# Patient Record
Sex: FEMALE | Race: WHITE
[De-identification: names, ages, dates, MRNs, and addresses within clinical notes are randomized per-mention and may not be internally consistent; named-entity substitution may affect disease eponyms.]

---

## 2019-10-31 ENCOUNTER — HOSPITAL ENCOUNTER (EMERGENCY)
Dept: HOSPITAL 76 - ED | Age: 34
LOS: 1 days | Discharge: LEFT BEFORE DISCHARGE | End: 2019-11-01
Payer: MEDICAID

## 2019-10-31 VITALS — SYSTOLIC BLOOD PRESSURE: 125 MMHG | DIASTOLIC BLOOD PRESSURE: 78 MMHG

## 2019-10-31 DIAGNOSIS — Z53.21: Primary | ICD-10-CM

## 2019-11-01 ENCOUNTER — HOSPITAL ENCOUNTER (INPATIENT)
Dept: HOSPITAL 76 - ED | Age: 34
LOS: 1 days | Discharge: LEFT BEFORE DISCHARGE | DRG: 872 | End: 2019-11-02
Attending: SPECIALIST | Admitting: INTERNAL MEDICINE
Payer: MEDICAID

## 2019-11-01 DIAGNOSIS — R45.1: ICD-10-CM

## 2019-11-01 DIAGNOSIS — L03.114: ICD-10-CM

## 2019-11-01 DIAGNOSIS — B19.20: ICD-10-CM

## 2019-11-01 DIAGNOSIS — F11.20: ICD-10-CM

## 2019-11-01 DIAGNOSIS — F15.20: ICD-10-CM

## 2019-11-01 DIAGNOSIS — Z59.0: ICD-10-CM

## 2019-11-01 DIAGNOSIS — L02.414: ICD-10-CM

## 2019-11-01 DIAGNOSIS — S51.032S: ICD-10-CM

## 2019-11-01 DIAGNOSIS — F17.210: ICD-10-CM

## 2019-11-01 DIAGNOSIS — A41.9: Primary | ICD-10-CM

## 2019-11-01 DIAGNOSIS — Z53.29: ICD-10-CM

## 2019-11-01 LAB
ALBUMIN DIAFP-MCNC: 3.6 G/DL (ref 3.2–5.5)
ALBUMIN/GLOB SERPL: 0.8 {RATIO} (ref 1–2.2)
ALP SERPL-CCNC: 73 IU/L (ref 42–121)
ALT SERPL W P-5'-P-CCNC: 65 IU/L (ref 10–60)
ANION GAP SERPL CALCULATED.4IONS-SCNC: 11 MMOL/L (ref 6–13)
AST SERPL W P-5'-P-CCNC: 65 IU/L (ref 10–42)
BASOPHILS NFR BLD AUTO: 0.1 10^3/UL (ref 0–0.1)
BASOPHILS NFR BLD AUTO: 0.5 %
BILIRUB BLD-MCNC: 0.7 MG/DL (ref 0.2–1)
BUN SERPL-MCNC: 18 MG/DL (ref 6–20)
CALCIUM UR-MCNC: 8.9 MG/DL (ref 8.5–10.3)
CHLORIDE SERPL-SCNC: 93 MMOL/L (ref 101–111)
CO2 SERPL-SCNC: 29 MMOL/L (ref 21–32)
CREAT SERPLBLD-SCNC: 0.5 MG/DL (ref 0.4–1)
EOSINOPHIL # BLD AUTO: 0.2 10^3/UL (ref 0–0.7)
EOSINOPHIL NFR BLD AUTO: 1.3 %
ERYTHROCYTE [DISTWIDTH] IN BLOOD BY AUTOMATED COUNT: 14 % (ref 12–15)
GFRSERPLBLD MDRD-ARVRAT: 142 ML/MIN/{1.73_M2} (ref 89–?)
GLOBULIN SER-MCNC: 4.5 G/DL (ref 2.1–4.2)
GLUCOSE SERPL-MCNC: 120 MG/DL (ref 70–100)
HGB UR QL STRIP: 11.6 G/DL (ref 12–16)
INR PPP: 1.2 (ref 0.8–1.2)
LIPASE SERPL-CCNC: 23 U/L (ref 22–51)
LYMPHOCYTES # SPEC AUTO: 2 10^3/UL (ref 1.5–3.5)
LYMPHOCYTES NFR BLD AUTO: 15.6 %
MCH RBC QN AUTO: 26.9 PG (ref 27–31)
MCHC RBC AUTO-ENTMCNC: 31.5 G/DL (ref 32–36)
MCV RBC AUTO: 85.2 FL (ref 81–99)
MONOCYTES # BLD AUTO: 0.8 10^3/UL (ref 0–1)
MONOCYTES NFR BLD AUTO: 5.9 %
NEUTROPHILS # BLD AUTO: 9.8 10^3/UL (ref 1.5–6.6)
NEUTROPHILS # SNV AUTO: 12.8 X10^3/UL (ref 4.8–10.8)
NEUTROPHILS NFR BLD AUTO: 76.2 %
PDW BLD AUTO: 9.2 FL (ref 7.9–10.8)
PLATELET # BLD: 423 10^3/UL (ref 130–450)
PROT SPEC-MCNC: 8.1 G/DL (ref 6.7–8.2)
PROTHROM ACT/NOR PPP: 13.3 SECS (ref 9.9–12.6)
RBC MAR: 4.32 10^6/UL (ref 4.2–5.4)
SODIUM SERPLBLD-SCNC: 133 MMOL/L (ref 135–145)

## 2019-11-01 PROCEDURE — 73201 CT UPPER EXTREMITY W/DYE: CPT

## 2019-11-01 PROCEDURE — 80048 BASIC METABOLIC PNL TOTAL CA: CPT

## 2019-11-01 PROCEDURE — 85025 COMPLETE CBC W/AUTO DIFF WBC: CPT

## 2019-11-01 PROCEDURE — 83735 ASSAY OF MAGNESIUM: CPT

## 2019-11-01 PROCEDURE — 87181 SC STD AGAR DILUTION PER AGT: CPT

## 2019-11-01 PROCEDURE — 99283 EMERGENCY DEPT VISIT LOW MDM: CPT

## 2019-11-01 PROCEDURE — 80053 COMPREHEN METABOLIC PANEL: CPT

## 2019-11-01 PROCEDURE — 99285 EMERGENCY DEPT VISIT HI MDM: CPT

## 2019-11-01 PROCEDURE — 83690 ASSAY OF LIPASE: CPT

## 2019-11-01 PROCEDURE — 36415 COLL VENOUS BLD VENIPUNCTURE: CPT

## 2019-11-01 PROCEDURE — 85651 RBC SED RATE NONAUTOMATED: CPT

## 2019-11-01 PROCEDURE — 87640 STAPH A DNA AMP PROBE: CPT

## 2019-11-01 PROCEDURE — 87040 BLOOD CULTURE FOR BACTERIA: CPT

## 2019-11-01 PROCEDURE — 82550 ASSAY OF CK (CPK): CPT

## 2019-11-01 PROCEDURE — 87070 CULTURE OTHR SPECIMN AEROBIC: CPT

## 2019-11-01 PROCEDURE — 85610 PROTHROMBIN TIME: CPT

## 2019-11-01 PROCEDURE — 96365 THER/PROPH/DIAG IV INF INIT: CPT

## 2019-11-01 PROCEDURE — 0H9EXZZ DRAINAGE OF LEFT LOWER ARM SKIN, EXTERNAL APPROACH: ICD-10-PCS | Performed by: ORTHOPAEDIC SURGERY

## 2019-11-01 PROCEDURE — 86140 C-REACTIVE PROTEIN: CPT

## 2019-11-01 PROCEDURE — 83605 ASSAY OF LACTIC ACID: CPT

## 2019-11-01 PROCEDURE — 87205 SMEAR GRAM STAIN: CPT

## 2019-11-01 PROCEDURE — 80202 ASSAY OF VANCOMYCIN: CPT

## 2019-11-01 RX ADMIN — SODIUM CHLORIDE, POTASSIUM CHLORIDE, SODIUM LACTATE AND CALCIUM CHLORIDE SCH MLS/HR: 600; 310; 30; 20 INJECTION, SOLUTION INTRAVENOUS at 16:41

## 2019-11-01 RX ADMIN — SODIUM CHLORIDE SCH MLS/HR: 9 INJECTION, SOLUTION INTRAVENOUS at 20:12

## 2019-11-01 RX ADMIN — OXYCODONE PRN MG: 5 TABLET ORAL at 20:48

## 2019-11-01 RX ADMIN — SODIUM CHLORIDE, POTASSIUM CHLORIDE, SODIUM LACTATE AND CALCIUM CHLORIDE SCH MLS/HR: 600; 310; 30; 20 INJECTION, SOLUTION INTRAVENOUS at 08:00

## 2019-11-01 RX ADMIN — SODIUM CHLORIDE SCH MLS/HR: 9 INJECTION, SOLUTION INTRAVENOUS at 13:57

## 2019-11-01 RX ADMIN — SODIUM CHLORIDE, PRESERVATIVE FREE SCH ML: 5 INJECTION INTRAVENOUS at 16:41

## 2019-11-01 RX ADMIN — HEPARIN SODIUM SCH UNIT: 5000 INJECTION, SOLUTION INTRAVENOUS; SUBCUTANEOUS at 20:36

## 2019-11-01 RX ADMIN — ACETAMINOPHEN PRN MG: 325 TABLET ORAL at 20:47

## 2019-11-01 RX ADMIN — THERA TABS SCH TAB: TAB at 16:41

## 2019-11-01 SDOH — ECONOMIC STABILITY - HOUSING INSECURITY: HOMELESSNESS: Z59.0

## 2019-11-01 NOTE — OPERATIVE REPORT
DATE OF SERVICE: 11/01/2019

Physician: Thien Saenz MD

 

PREOPERATIVE DIAGNOSIS:  Left forearm abscess.

 

POSTOPERATIVE DIAGNOSIS:  Left forearm abscess.

 

PROCEDURE PERFORMED:  Incision and drainage of left forearm abscess.

 

SURGEON:  Thien Saezn MD

 

ANESTHESIA:  General.

 

DESCRIPTION OF PROCEDURE:  The patient was taken to the operating room on the early afternoon of 11/0
1/2019, where she was placed under general anesthetic in the supine position without any complication
s.  We then prepped and draped the left upper extremity in the usual fashion for our procedure.  

 

A longitudinal lateral skin incision was then made overlying the prominence in the lateral proximal f
orearm where the abscess was.  After the skin incision was made, we came into the abscess pocket.  Pu
rulent drainage was noted and samples were sent to microbiology for aerobic and anaerobic cultures.  
We then copiously irrigated the abscess cavity with sterile saline using a total of 2000 mL of irriga
tion solution.  Afterwards, we packed the cavity open with 1/2-inch gauze roll and then a soft dressi
ng overlying the wound.  The patient was then awoken from her anesthesia and taken to the recovery ro
om in satisfactory condition after we completed the dressings with a roll of gauze, Kerlix, and an Ac
e wrap.

 

ESTIMATED BLOOD LOSS:  50 mL

 

REPLACEMENT:  600 mL of crystalloid.

 

TOURNIQUET TIME:  None.

 

PLAN:  We will change the dressing and packing in 24-36 hours.  We will then do daily packing changes
 and let the wound heal secondarily.  We will continue on IV antibiotics.

 

 

DD: 11/01/2019 12:43

TD: 11/01/2019 12:46

Job #: [id number]

## 2019-11-01 NOTE — ED PHYSICIAN DOCUMENTATION
History of Present Illness





- Stated complaint


Stated Complaint: LT ARM PX





- Chief complaint


Chief Complaint: Wound





- History obtained from


History obtained from: Patient





- History of Present Illness


Timing: Yesterday


Pain level now: 8


Improved by: rest


Worsened by: movement, palpation


Associated symptoms: swelling, rash





- Additonal information


Additional information: 





c/o left arm redness, swelling, pain, tenderness since yesterday. Patient is IV 

heroin user and she "tried to inject" into the area 3 days ago (says she could 

not get into a vein), and yesterday developed these symptoms and signs which 

have rapidly progressed since then. She denies h/o similar problem. 





Review of Systems


Constitutional: denies: Fever, Chills, Sweats


Cardiac: reports: Reviewed and negative


Respiratory: reports: Reviewed and negative


GI: reports: Reviewed and negative


Skin: reports: Rash


Musculoskeletal: reports: Extremity pain, Joint pain, Extremity swelling, Joint 

swelling


Neurologic: denies: Focal weakness, Numbness





PD PAST MEDICAL HISTORY





- Past Medical History


Cardiovascular: None


Respiratory: None


Neuro: None


Endocrine/Autoimmune: None


GI: None, Hepatitis


GYN: None


: None


HEENT: None


Psych: None


Musculoskeletal: None


Derm: Other


Other Past Medical History: multiple open lesions noted to face and arms.  

HEPATITIS C





- Past Surgical History


Past Surgical History: Yes


General: Cholecystectomy


/GYN: Tubal ligation





- Present Medications


Home Medications: 


                                Ambulatory Orders











 Medication  Instructions  Recorded  Confirmed


 


No Known Home Medications  10/31/19 10/31/19














- Allergies


Allergies/Adverse Reactions: 


                                    Allergies











Allergy/AdvReac Type Severity Reaction Status Date / Time


 


No Known Drug Allergies Allergy   Verified 10/31/19 22:36














- Social History


Does the pt smoke?: Yes


Smoking Status: Current every day smoker


Does the pt drink ETOH?: No


Does the pt have substance abuse?: Yes


Substance Use and Type: Meth, Heroin





- Immunizations


Immunizations are current?: Yes





- POLST


Patient has POLST: No





PD ED PE NORMAL





- Vitals


Vital signs reviewed: Yes





- General


General: Alert and oriented X 3, No acute distress, Well developed/nourished





- Neck


Neck: Supple, no meningeal sign





- Cardiac


Cardiac: RRR, No murmur





- Respiratory


Respiratory: No respiratory distress, Clear bilaterally





- Abdomen


Abdomen: Soft, Non tender





PD ED PE EXPANDED





- Extremities


SHASTA UE/Hands Visual: 


                            __________________________














                            __________________________





 1 - rash (confluent erythema with sharp margins, hot to touch, worst at 

anterolateral elbow where there is large focal swelling and fluctuance with 

exquisite tenderness. limited elbow and FA ROM due to pain)








Results





- Vitals


Vitals: 


                               Vital Signs - 24 hr











  11/01/19 11/01/19 11/01/19





  02:25 04:41 05:01


 


Temperature 36.5 C  36.5 C


 


Heart Rate 112 H 100 98


 


Respiratory 20 15 16





Rate   


 


Blood Pressure 124/89 H 105/69 105/69


 


O2 Saturation 99 97 98








                                     Oxygen











O2 Source                      Room air

















- Labs


Labs: 


                                Laboratory Tests











  11/01/19 11/01/19 11/01/19





  03:00 03:00 03:00


 


WBC  12.8 H  


 


RBC  4.32  


 


Hgb  11.6 L  


 


Hct  36.8 L  


 


MCV  85.2  


 


MCH  26.9 L  


 


MCHC  31.5 L  


 


RDW  14.0  


 


Plt Count  423  


 


MPV  9.2  


 


Neut # (Auto)  9.8 H  


 


Lymph # (Auto)  2.0  


 


Mono # (Auto)  0.8  


 


Eos # (Auto)  0.2  


 


Baso # (Auto)  0.1  


 


Absolute Nucleated RBC  0.00  


 


Nucleated RBC %  0.0  


 


ESR   


 


PT   


 


INR   


 


Sodium   133 L 


 


Potassium   3.5 


 


Chloride   93 L 


 


Carbon Dioxide   29 


 


Anion Gap   11.0 


 


BUN   18 


 


Creatinine   0.5 


 


Estimated GFR (MDRD)   142 


 


Glucose   120 H 


 


Lactic Acid    0.8


 


Calcium   8.9 


 


Total Bilirubin   0.7 


 


AST   65 H 


 


ALT   65 H 


 


Alkaline Phosphatase   73 


 


Total Creatine Kinase   


 


C-Reactive Protein   


 


Total Protein   8.1 


 


Albumin   3.6 


 


Globulin   4.5 H 


 


Albumin/Globulin Ratio   0.8 L 


 


Lipase   23 














  11/01/19 11/01/19 11/01/19





  03:00 03:00 03:00


 


WBC   


 


RBC   


 


Hgb   


 


Hct   


 


MCV   


 


MCH   


 


MCHC   


 


RDW   


 


Plt Count   


 


MPV   


 


Neut # (Auto)   


 


Lymph # (Auto)   


 


Mono # (Auto)   


 


Eos # (Auto)   


 


Baso # (Auto)   


 


Absolute Nucleated RBC   


 


Nucleated RBC %   


 


ESR  40 H  


 


PT   


 


INR   


 


Sodium   


 


Potassium   


 


Chloride   


 


Carbon Dioxide   


 


Anion Gap   


 


BUN   


 


Creatinine   


 


Estimated GFR (MDRD)   


 


Glucose   


 


Lactic Acid   


 


Calcium   


 


Total Bilirubin   


 


AST   


 


ALT   


 


Alkaline Phosphatase   


 


Total Creatine Kinase    57


 


C-Reactive Protein   6.4 H 


 


Total Protein   


 


Albumin   


 


Globulin   


 


Albumin/Globulin Ratio   


 


Lipase   














  11/01/19





  03:00


 


WBC 


 


RBC 


 


Hgb 


 


Hct 


 


MCV 


 


MCH 


 


MCHC 


 


RDW 


 


Plt Count 


 


MPV 


 


Neut # (Auto) 


 


Lymph # (Auto) 


 


Mono # (Auto) 


 


Eos # (Auto) 


 


Baso # (Auto) 


 


Absolute Nucleated RBC 


 


Nucleated RBC % 


 


ESR 


 


PT  13.3 H


 


INR  1.2


 


Sodium 


 


Potassium 


 


Chloride 


 


Carbon Dioxide 


 


Anion Gap 


 


BUN 


 


Creatinine 


 


Estimated GFR (MDRD) 


 


Glucose 


 


Lactic Acid 


 


Calcium 


 


Total Bilirubin 


 


AST 


 


ALT 


 


Alkaline Phosphatase 


 


Total Creatine Kinase 


 


C-Reactive Protein 


 


Total Protein 


 


Albumin 


 


Globulin 


 


Albumin/Globulin Ratio 


 


Lipase 














- Rads (name of study)


  ** CT LUE with IV contrast


Radiology: Prelim report reviewed, See rad report





PD MEDICAL DECISION MAKING





- ED course


Complexity details: reviewed results, re-evaluated patient, considered 

differential, d/w patient





Departure





- Departure


Disposition: [facility] DC/Xfer


Clinical Impression: 


 Abscess, Cellulitis





Condition: Good


Discharge Date/Time: 11/01/19 06:00

## 2019-11-01 NOTE — ANESTHESIA
Pre-Anesthesia VS, & Labs





- Diagnosis





left forearm abcess





- Procedure





left forearm InD


Vital Signs: 





                                        











Temp Pulse Resp BP Pulse Ox


 


 36.7 C   93   16   100/63   98 


 


 19 10:58  19 10:58  19 10:58  19 10:58  19 10:58














                                        





Height                           5 ft 3 in


Weight (kg)                      50.5 kg


Body Mass Index                  19.7











- Pregnancy


Is Patient Pregnant?: No





- Lab Results


Current Lab Results: 





Laboratory Tests





19 03:00: PT 13.3 H, INR 1.2


19 03:00: Total Creatine Kinase 57


19 03:00: C-Reactive Protein 6.4 H


19 03:00: ESR 40 H


19 03:00: Lactic Acid 0.8


19 03:00: Sodium 133 L, Potassium 3.5, Chloride 93 L, Carbon Dioxide 29, 

Anion Gap 11.0, BUN 18, Creatinine 0.5, Estimated GFR (MDRD) 142, Glucose 120 H,

Calcium 8.9, Total Bilirubin 0.7, AST 65 H, ALT 65 H, Alkaline Phosphatase 73, 

Total Protein 8.1, Albumin 3.6, Globulin 4.5 H, Albumin/Globulin Ratio 0.8 L, 

Lipase 23


19 03:00: WBC 12.8 H, RBC 4.32, Hgb 11.6 L, Hct 36.8 L, MCV 85.2, MCH 26.9

L, MCHC 31.5 L, RDW 14.0, Plt Count 423, MPV 9.2, Neut # (Auto) 9.8 H, Lymph # 

(Auto) 2.0, Mono # (Auto) 0.8, Eos # (Auto) 0.2, Baso # (Auto) 0.1, Absolute 

Nucleated RBC 0.00, Nucleated RBC % 0.0








Fish Bones: 


                                 19 05:15





                                 19 05:15





Home Medications and Allergies





Active Medications





Heparin Sodium (Porcine) ()  5,000 unit SUBQ BID [facility]


Lactated Ringer's (Lr)  1,000 mls @ 100 mls/hr IV .Q10H [facility]


   Last Admin: 19 08:00 Dose:  100 mls/hr


Vancomycin HCl 0.75 gm/ Sodium (Chloride)  250 mls @ 167 mls/hr IV Q8H [facility]


Sodium Chloride (Normal Saline Flush 0.9%)  10 ml IVP PRN PRN


   PRN Reason: AS NEEDED PER PROVIDER ORDERS


Sodium Chloride (Normal Saline Flush 0.9%)  10 ml IVP 0100,0900,1700 [facility]


   Last Admin: 19 10:48 Dose:  Not Given





                                        





No Known Home Medications  10/31/19 








Allergies/Adverse Reactions: 


                                    Allergies











Allergy/AdvReac Type Severity Reaction Status Date / Time


 


No Known Drug Allergies Allergy   Verified 10/31/19 22:36














Anes History & Medical History





- Anesthetic History


Anesthesia Complications: reports: No previous complications


Family history of Anesthesia Complications: Denies


Family history of Malignant Hyperthermia: Denies





- Medical History


Cardiovascular: reports: None


Pulmonary: reports: None


Gastrointestinal: reports: None, Hepatitis (HepC positive for a year)


Urinary: reports: None


Neuro: reports: None


Musculoskeletal: reports: None


Endocrine/Autoimmune: reports: None


Blood Disorders: reports: None


Skin: reports: None, Other


Smoking Status: Current every day smoker (1/2PPD for 15 years)


Psychosocial: reports: Depression, Anxiety, Amphetamine, Other (heroin. last 

used 2 days ago)


Other Past Medical History: multiple open lesions noted to face and arms.  

HEPATITIS C





- Surgical History


General: Cholecystectomy


Gynecologic: Tubal ligation





Exam


General: Alert, Oriented x3, Cooperative, No acute distress


Dental: Poor dentition (missing teeth and poor dentition.)


Mouth Openin Fingerbreadth


Neck Mobility: Normal


Mallampati classification: III


Thyromental Distance: 4-6 cm


Respiratory: Lungs clear, Normal breath sounds, No respiratory distress, No 

accessory muscle use


Cardiovascular: Regular rate, Normal S1, Normal S2, No murmurs


Abdomen: Normal bowel sounds, Soft, No tenderness, No hepatospenomegaly, No mass

es


Extremities: No clubbing, No cyanosis, No edema, Normal pulses, No tende

rness/swelling


Neurological: Normal gait, Normal speech, Strength at 5/5 X4 ext, Normal tone, 

Sensation intact, Cranial nerves 3-12 NL, Reflexes 2+


Mental/Cognitive Status: Alert/Oriented X3, Normal for patient


Cognitive Status: Within normal limits





Plan


Anesthesia Type: General


Consent for Procedure(s) Verified and Reviewed: Yes


Code Status: Attempt Resuscitation


ASA classification: 2-Mild systemic disease


Is this case an emergency?: No

## 2019-11-01 NOTE — CONSULTATION NOTE
DATE OF SERVICE: 11/01/2019

Physician: Thien Saenz MD

 

REFERRING PHYSICIAN:  Dr. Tabor of the hospitalist service

 

CHIEF COMPLAINT:  "My left elbow hurts."

 

HISTORY OF PRESENT ILLNESS:  Patient is a 33-year-old  female, right hand dominant, IV drug 
abuser, who apparently injected the lateral aspect of her elbow approximately 4 days ago.  About 2 da
ys prior to admission, she began noticing increasing redness, swelling and tenderness at the injectio
n site.  It finally reached the point where she had to seek medical attention last evening.  She ritchie
es any distal weakness or numbness.  Is able to move her fingers and her thumb well.  Denies any prio
r abscesses that required surgical treatment.  Apparently, she had some food at about 2 in the mornin
g.

 

PHYSICAL EXAMINATION:  Patient's left forearm and elbow were examined.  She is hesitant to move the e
lbow secondary to pain to the mass that is visible in the lateral aspect of her antecubital fossa reg
ion.  This mass appears to be about 4 cm in diameter, is red, tender, and quite swollen.  Neurovascul
ar appears to be intact distally in the upper extremity.

 

IMAGING:  CT scan that was done of the forearm was reviewed and shows a collection of fluid, likely a
n abscess, which corresponds to the area that was seen clinically.

 

ASSESSMENT:  

1.  Left nondominant forearm abscess lateral to the antecubital area from intravenous injection site 
4 days ago.

2.  History of intravenous drug abuse.

 

PLAN:  We will plan on taking her to the operating room later this morning to do an incision and drai
nage of her abscess in the forearm.  The risks and benefits of surgery were explained to patient.  Chepe sears of her questions were answered.  She wished to proceed with surgery as planned.  Consent signed.

 

 

DD: 11/01/2019 10:34

TD: 11/01/2019 10:36

Job #: [id number]

## 2019-11-01 NOTE — HISTORY & PHYSICAL EXAMINATION
Chief Complaint





- Chief Complaint


Chief Complaint: Left arm swelling and redness





History of Present Illness





- Admitted From


Admitted From:: Home





- History Obtained From


Records Reviewed: Yes


History obtained from: Patient, ER Physician





- History of Present Illness


HPI Comment/Other: 


This is a 33 year old homeless female with a past medical history significant 

for meth and heroin use, and hepatitis C who presents complaining of left arm 

redness and swelling. Her arm became red two days ago but this has progressed 

over the last two days. She now has an area of swelling near her elbow that is 

tender to touch. She does use IV heroin and meth with her last use being one day

ago when she used both. She last injected in that arm about four days ago. She 

reports no fevers, chills, nausea, vomiting. She has not had similar complaints 

in the past. She does have hepatitis C but denies a history of HIV.





In the emergency department, she was afebrile but tachycardic. Blood pressure 

was borderline hypotensive but she reports that her blood pressure is on the 

lower side. Labs revealed an elevated white count with a left shift. Normal 

lactic acid. AST/ALT are mildly elevated. A CT of the left arm revealed a 

[id number] abscess at the lateral aspect for AC fossa involving subcutaneous tissue

and proximal forearm muscles. Medicine was consulted for admission given these 

findings.








History





- Past Medical History


Cardiovascular: reports: None


Respiratory: reports: None


Neuro: reports: None


Endocrine/Autoimmune: reports: None


GI: reports: None, Hepatitis (C)


GYN: reports: None


: reports: None


HEENT: reports: None


Psych: reports: None


Musculoskeletal: reports: None


MRSA Hx?: No


Other Past Medical History: IV drug use





- Past Surgical History


General: reports: Cholecystectomy


/GYN: reports: Tubal ligation





- Family & Social History


Family History Comment/Other: She was adopted at a young age and therefore does 

not know her family history.


Living arrangement: Homeless


Social History Notes: She is homeless and lives in [place], [place]. She is visiting 

her aunt and uncle here on [facility] for a few days. Reports smoking half a

pack of cigarettes a day. Uses IV heroin and meth frequently. Last use was one 

day ago and she has been doing this since a teenager. Denies alcohol use.





- Substance History


Abuse: Recurrent use of substance despite neg consequences: Amphetamine, Opioid





- POLST


Patient has POLST: No





Meds/Allgy





- Home Medications


Home Medications: 


                                Ambulatory Orders











 Medication  Instructions  Recorded  Confirmed


 


No Known Home Medications  10/31/19 10/31/19














- Allergies


Allergies/Adverse Reactions: 


                                    Allergies











Allergy/AdvReac Type Severity Reaction Status Date / Time


 


No Known Drug Allergies Allergy   Verified 10/31/19 22:36














Review of Systems





- Constitutional


Constitutional: denies: Fatigue, Fever, Chills, Weakness, Poor appetite





- Cardiovascular


Cariovascular: denies: Chest pain, Lightheadedness, Exertional dyspnea, Decr. e

xercise tolerance





- Respiratory


Respiratory: denies: Cough, SOB at rest, SOB with exertion





- Gastrointestinal


Gastrointestinal: denies: Abdominal pain, Nausea, Vomiting





- Genitourinary


Genitourinary: denies: Dysuria, Frequency, Urgency





- Musculoskeletal


Musculoskeletal: reports: Stiffness, Limited range of motion





- Integumentary


Integumentary: reports: Other (Erythema of left arm).  denies: Rash





- Neurological


Neurological: reports: Numbness.  denies: General weakness, Focal weakness, 

Dizziness





- All Other Systems


All Other Systems: reports: Reviewed and negative


Prior Level of Functionality: 


Independent with ADL's.








Exam





- Vital Signs


Reviewed Vital Signs: Yes


Vital Signs: 





                                Vital Signs x48h











  Temp Pulse Resp BP Pulse Ox


 


 11/01/19 05:01  36.5 C  98  16  105/69  98


 


 11/01/19 04:41   100  15  105/69  97


 


 11/01/19 02:25  36.5 C  112 H  20  124/89 H  99














- Physical Exam


General Appearance: positive: No acute distress, Alert


Eyes Bilateral: positive: Normal inspection


ENT: positive: ENT inspection nml


Neck: positive: Nml inspection


Respiratory: positive: No respiratory distress, Breath sounds nml.  negative: 

Wheezes, Rales, Rhonchi


Cardiovascular: positive: No murmur, Tachycardia.  negative: Bradycardia, 

Systolic murmur, Diastolic murmur


Peripheral Pulses: positive: 2+


Abdomen: positive: Non-tender, No distention.  negative: Tenderness, Guarding, 

Rebound


Skin: positive: Color nml, Warm, Dry, Other (Erythema of her left arm extending 

form the mid humerus to the mid forearm. There is an area of induration over the

lateral aspect of her forearm that is about 3cm. It is tender and warm to 

touch.)


Extremities: positive: No pedal edema.  negative: Non-tender (Decreased range of

motion of the left forearm secondary to swelling and pain.)


Neurologic/Psychiatric: positive: Oriented x3, Motor nml.  negative: Disoriented

to person, Disoriented to place, Disoriented to time, Weakness





Sepsis Event Note (H)





- Evaluation


Current Stage of Sepsis: Sepsis


Possible source of Sepsis: positive: Skin/soft tissue





- Sepsis Criteria


Sepsis Criteria: Recorded Heart Rate greater than 90 bpm, WBC count greater than

12,000 or less than 4000





Conclusion/Plan





- Problem List


(1) Sepsis due to cellulitis


Conclusion/Plan: 


This is secondary to her left forearm abscess and cellulitis. Presented with 

tachycardia and leukocytosis with left shift. Will continue Vancomycin IV and 

follow up blood cultures to rule out bacteremia. Monitor CBC.








(2) Abscess of left forearm


Conclusion/Plan: 


She has a [id number] abscess of the proximal left forearm involving the 

subcutaneous tissue and proximal forearm muscles and surround cellulitis. This 

is the source of her sepsis. Likely secondary to her IV drug use. Will continue 

Vancomycin IV given her IV drug use history to cover for MRSA. Will hold off on 

anaerobic coverage as she is not immunocompromised. Check ESR/CRP. Will consult 

orthopedics for I&D.








(3) IV drug user


Conclusion/Plan: 


She regularly uses IV meth and heroin with last use being one day ago. Does not 

appear to be going through withdrawal at this time. Will monitor for signs of 

withdrawal. Social work consult.








(4) Hepatitis C


Conclusion/Plan: 


Secondary to IV drug use. AST/ALT elevated in the 60's. Outpatient follow up.








- Lab Results


Lab results reviewed: Yes


Fish Bones: 


                                 11/01/19 03:00





                                 11/01/19 03:00





- Diagnostic Imaging Results


Diagnostic Imaging Results: positive: Final report reviewed





Core Measures





- Anticipated LOS


I expect patient to be DC'd or transferred within 96 hours.: Yes





- Issues


Hospital Issues and Management Plan: 


Abscess of left forearm extending to musculature that will require drainage and 

IV antibiotics.








- DVT/VTE - Prophylaxis


VTE/DVT Device ordered at admit?: Yes


VTE/DVT Prophylaxis med ordered at admit?: Yes

## 2019-11-01 NOTE — PROVIDER PROGRESS NOTE
Subjective





- Prog Note Date


Prog Note Date: 11/01/19





- Subjective


Pt reports feeling: No change (Ms. Leslie is still quite sleepy from her surgery

this morning. She rouses easily to voice. ROM is quite limited due to pain. She 

has not eaten yet today. Was just getting up to void for the first time)





Objective





- Vital Signs/Intake & Output


Vital Signs: 


                                Vital Signs x48h











  Temp Pulse Pulse Resp BP BP Pulse Ox


 


 11/01/19 14:36  36.3 C L   89  16   92/59 L  98


 


 11/01/19 14:00  36.3 C L   81  16   94/59 L  98


 


 11/01/19 13:30  36.4 C L   87  16   99/62  97


 


 11/01/19 13:22  36.4 C L   91  16   100/60  96


 


 11/01/19 13:09  36.7 C  103 H   16  104/75   100


 


 11/01/19 13:04  36.7 C  102 H   16  92/61   98


 


 11/01/19 12:59  36.7 C  80   20  101/62   100


 


 11/01/19 12:54  36.7 C  79   16  95/67   100


 


 11/01/19 12:49  36.7 C  85   16  90/63   100


 


 11/01/19 12:44  36.9 C  79   16  95/67   100


 


 11/01/19 10:58  36.7 C   93  16   100/63  98


 


 11/01/19 08:44  36.6 C  89   16    97


 


 11/01/19 08:10  36.4 C L   98  16   94/61  97


 


 11/01/19 07:51  36.6 C   89  16   95/59 L  99











Intake & Output: 


                                 Intake & Output











 10/29/19 10/30/19 10/31/19 11/01/19





 23:59 23:59 23:59 23:59


 


Intake Total    1840


 


Balance    1840














- Lab Results


Fish Bones: 


                                 11/01/19 03:00





                                 11/01/19 03:00


Other Labs: 


                               Lab Results x24hrs











  11/01/19 11/01/19 11/01/19 Range/Units





  03:00 03:00 03:00 


 


WBC     (4.8-10.8)  x10^3/uL


 


RBC     (4.20-5.40)  10^6/uL


 


Hgb     (12.0-16.0)  g/dL


 


Hct     (37.0-47.0)  %


 


MCV     (81.0-99.0)  fL


 


MCH     (27.0-31.0)  pg


 


MCHC     (32.0-36.0)  g/dL


 


RDW     (12.0-15.0)  %


 


Plt Count     (130-450)  10^3/uL


 


MPV     (7.9-10.8)  fL


 


Neut # (Auto)     (1.5-6.6)  10^3/uL


 


Lymph # (Auto)     (1.5-3.5)  10^3/uL


 


Mono # (Auto)     (0.0-1.0)  10^3/uL


 


Eos # (Auto)     (0.0-0.7)  10^3/uL


 


Baso # (Auto)     (0.0-0.1)  10^3/uL


 


Absolute Nucleated RBC     x10^3/uL


 


Nucleated RBC %     /100WBC


 


ESR     (0-20)  mm/Hr


 


PT  13.3 H    (9.9-12.6)  secs


 


INR  1.2    (0.8-1.2)  


 


Sodium     (135-145)  mmol/L


 


Potassium     (3.5-5.0)  mmol/L


 


Chloride     (101-111)  mmol/L


 


Carbon Dioxide     (21-32)  mmol/L


 


Anion Gap     (6-13)  


 


BUN     (6-20)  mg/dL


 


Creatinine     (0.4-1.0)  mg/dL


 


Estimated GFR (MDRD)     (>89)  


 


Glucose     ()  mg/dL


 


Lactic Acid     (0.5-2.2)  mmol/L


 


Calcium     (8.5-10.3)  mg/dL


 


Total Bilirubin     (0.2-1.0)  mg/dL


 


AST     (10-42)  IU/L


 


ALT     (10-60)  IU/L


 


Alkaline Phosphatase     ()  IU/L


 


Total Creatine Kinase   57   ()  IU/L


 


C-Reactive Protein    6.4 H  (0-1.0)  mg/dL


 


Total Protein     (6.7-8.2)  g/dL


 


Albumin     (3.2-5.5)  g/dL


 


Globulin     (2.1-4.2)  g/dL


 


Albumin/Globulin Ratio     (1.0-2.2)  


 


Lipase     (22-51)  U/L














  11/01/19 11/01/19 11/01/19 Range/Units





  03:00 03:00 03:00 


 


WBC     (4.8-10.8)  x10^3/uL


 


RBC     (4.20-5.40)  10^6/uL


 


Hgb     (12.0-16.0)  g/dL


 


Hct     (37.0-47.0)  %


 


MCV     (81.0-99.0)  fL


 


MCH     (27.0-31.0)  pg


 


MCHC     (32.0-36.0)  g/dL


 


RDW     (12.0-15.0)  %


 


Plt Count     (130-450)  10^3/uL


 


MPV     (7.9-10.8)  fL


 


Neut # (Auto)     (1.5-6.6)  10^3/uL


 


Lymph # (Auto)     (1.5-3.5)  10^3/uL


 


Mono # (Auto)     (0.0-1.0)  10^3/uL


 


Eos # (Auto)     (0.0-0.7)  10^3/uL


 


Baso # (Auto)     (0.0-0.1)  10^3/uL


 


Absolute Nucleated RBC     x10^3/uL


 


Nucleated RBC %     /100WBC


 


ESR  40 H    (0-20)  mm/Hr


 


PT     (9.9-12.6)  secs


 


INR     (0.8-1.2)  


 


Sodium    133 L  (135-145)  mmol/L


 


Potassium    3.5  (3.5-5.0)  mmol/L


 


Chloride    93 L  (101-111)  mmol/L


 


Carbon Dioxide    29  (21-32)  mmol/L


 


Anion Gap    11.0  (6-13)  


 


BUN    18  (6-20)  mg/dL


 


Creatinine    0.5  (0.4-1.0)  mg/dL


 


Estimated GFR (MDRD)    142  (>89)  


 


Glucose    120 H  ()  mg/dL


 


Lactic Acid   0.8   (0.5-2.2)  mmol/L


 


Calcium    8.9  (8.5-10.3)  mg/dL


 


Total Bilirubin    0.7  (0.2-1.0)  mg/dL


 


AST    65 H  (10-42)  IU/L


 


ALT    65 H  (10-60)  IU/L


 


Alkaline Phosphatase    73  ()  IU/L


 


Total Creatine Kinase     ()  IU/L


 


C-Reactive Protein     (0-1.0)  mg/dL


 


Total Protein    8.1  (6.7-8.2)  g/dL


 


Albumin    3.6  (3.2-5.5)  g/dL


 


Globulin    4.5 H  (2.1-4.2)  g/dL


 


Albumin/Globulin Ratio    0.8 L  (1.0-2.2)  


 


Lipase    23  (22-51)  U/L














  11/01/19 Range/Units





  03:00 


 


WBC  12.8 H  (4.8-10.8)  x10^3/uL


 


RBC  4.32  (4.20-5.40)  10^6/uL


 


Hgb  11.6 L  (12.0-16.0)  g/dL


 


Hct  36.8 L  (37.0-47.0)  %


 


MCV  85.2  (81.0-99.0)  fL


 


MCH  26.9 L  (27.0-31.0)  pg


 


MCHC  31.5 L  (32.0-36.0)  g/dL


 


RDW  14.0  (12.0-15.0)  %


 


Plt Count  423  (130-450)  10^3/uL


 


MPV  9.2  (7.9-10.8)  fL


 


Neut # (Auto)  9.8 H  (1.5-6.6)  10^3/uL


 


Lymph # (Auto)  2.0  (1.5-3.5)  10^3/uL


 


Mono # (Auto)  0.8  (0.0-1.0)  10^3/uL


 


Eos # (Auto)  0.2  (0.0-0.7)  10^3/uL


 


Baso # (Auto)  0.1  (0.0-0.1)  10^3/uL


 


Absolute Nucleated RBC  0.00  x10^3/uL


 


Nucleated RBC %  0.0  /100WBC


 


ESR   (0-20)  mm/Hr


 


PT   (9.9-12.6)  secs


 


INR   (0.8-1.2)  


 


Sodium   (135-145)  mmol/L


 


Potassium   (3.5-5.0)  mmol/L


 


Chloride   (101-111)  mmol/L


 


Carbon Dioxide   (21-32)  mmol/L


 


Anion Gap   (6-13)  


 


BUN   (6-20)  mg/dL


 


Creatinine   (0.4-1.0)  mg/dL


 


Estimated GFR (MDRD)   (>89)  


 


Glucose   ()  mg/dL


 


Lactic Acid   (0.5-2.2)  mmol/L


 


Calcium   (8.5-10.3)  mg/dL


 


Total Bilirubin   (0.2-1.0)  mg/dL


 


AST   (10-42)  IU/L


 


ALT   (10-60)  IU/L


 


Alkaline Phosphatase   ()  IU/L


 


Total Creatine Kinase   ()  IU/L


 


C-Reactive Protein   (0-1.0)  mg/dL


 


Total Protein   (6.7-8.2)  g/dL


 


Albumin   (3.2-5.5)  g/dL


 


Globulin   (2.1-4.2)  g/dL


 


Albumin/Globulin Ratio   (1.0-2.2)  


 


Lipase   (22-51)  U/L














Sepsis Event Note (H)





- Evaluation


Current Stage of Sepsis: Sepsis


Possible source of Sepsis: positive: Skin/soft tissue





- Sepsis Criteria


Sepsis Criteria: Recorded Heart Rate greater than 90 bpm, WBC count greater than

12,000 or less than 4000





Assessment/Plan





- Problem List


(1) Sepsis due to cellulitis


Impression: 


Presented to the ED w/ R elbow swelling, pain, redness and tenderness 3 days 

after trying to inject into that same site. Sepsis criteria met with an elevated

HR (in low 100s) and a WBC of 12.8. BC were drawn and she was started on IV 

Vancomycin. CRP was slightly elevated at 6.4. ESR was 40. Kidney function WNL


-Check nares for MRSA


-Place on contact isolation until MRSA ruled out 


-CBC/BMP in the am 


-CRP/ESR in the am


-Follow blood cx


-Narrow ABX as able 








(2) Abscess of left forearm


Impression: 


Abscess of the proximal left forearm involving the subcutaneous tissue and 

proximal forearm muscles measures 3X3cm. Likely source of sepsis and caused by 

her IVDU. Othro did an I&D this morning and sent drainage fluid to the lab for 

cx. I&D site currently packed w/ gauze and wrapped in ACE bandage. Her ROM is 

moderately inhibited by pain and swelling. Sensation to LUE is intact. Will 

continue Vanco and narrow as needed once cultures result. 


-Nurse to assess CSM Q shift 


-Change dressing as needed if saturated








(3) IV drug user


Impression: 


She regularly uses IV meth and heroin with last use 2 days ago. Does not appear 

to have any symptoms of withdrawal. Social work is involved. 


-Monitor for s/s withdrawal


-Check for hep B


-Check for HIV








(4) Hepatitis C


Impression: 


Secondary to IVDU. Does not appear to have s/s liver injury at this time. ALT 

and AST slightly elevated at 65. 


-As noted, CMP in AM 


-Request that social work provide pt w/ list of PCPs for possible outpatient 

treatment of hep C. 








(5) Nicotine dependence


Impression: 


7.5 pack years. Not interested in quitting at this time. Declines Nicotine patch

at this time but she is aware that she can let us know if she changes her mind.


Qualifiers: 


   Nicotine product type: cigarettes

## 2019-11-01 NOTE — PROVIDER PROGRESS NOTE
Subjective





- Prog Note Date


Prog Note Date: 11/01/19


Prog Note Time: 10:25





- Subjective


Pt reports feeling: Worse (IV drug abuser with a four day history of worsening 

left proximal forearm swelling and redness at an injection site.)





Objective





- Vital Signs/Intake & Output


Vital Signs: 


                                Vital Signs x48h











  Temp Pulse Pulse Resp BP BP Pulse Ox


 


 11/01/19 08:44  36.6 C  89   16    97


 


 11/01/19 08:10  36.4 C L   98  16   94/61  97


 


 11/01/19 07:51  36.6 C   89  16   95/59 L  99


 


 11/01/19 06:20  37 C   96  16   96/50 L  98


 


 11/01/19 05:01  36.5 C  98   16  105/69   98


 


 11/01/19 04:41   100   15  105/69   97











Intake & Output: 


                                 Intake & Output











 10/29/19 10/30/19 10/31/19 11/01/19





 23:59 23:59 23:59 23:59


 


Intake Total    1600


 


Balance    1600














- Lab Results


Fish Bones: 


                                 11/01/19 03:00





                                 11/01/19 03:00


Other Labs: 


                               Lab Results x24hrs











  11/01/19 11/01/19 11/01/19 Range/Units





  03:00 03:00 03:00 


 


WBC     (4.8-10.8)  x10^3/uL


 


RBC     (4.20-5.40)  10^6/uL


 


Hgb     (12.0-16.0)  g/dL


 


Hct     (37.0-47.0)  %


 


MCV     (81.0-99.0)  fL


 


MCH     (27.0-31.0)  pg


 


MCHC     (32.0-36.0)  g/dL


 


RDW     (12.0-15.0)  %


 


Plt Count     (130-450)  10^3/uL


 


MPV     (7.9-10.8)  fL


 


Neut # (Auto)     (1.5-6.6)  10^3/uL


 


Lymph # (Auto)     (1.5-3.5)  10^3/uL


 


Mono # (Auto)     (0.0-1.0)  10^3/uL


 


Eos # (Auto)     (0.0-0.7)  10^3/uL


 


Baso # (Auto)     (0.0-0.1)  10^3/uL


 


Absolute Nucleated RBC     x10^3/uL


 


Nucleated RBC %     /100WBC


 


ESR     (0-20)  mm/Hr


 


PT  13.3 H    (9.9-12.6)  secs


 


INR  1.2    (0.8-1.2)  


 


Sodium     (135-145)  mmol/L


 


Potassium     (3.5-5.0)  mmol/L


 


Chloride     (101-111)  mmol/L


 


Carbon Dioxide     (21-32)  mmol/L


 


Anion Gap     (6-13)  


 


BUN     (6-20)  mg/dL


 


Creatinine     (0.4-1.0)  mg/dL


 


Estimated GFR (MDRD)     (>89)  


 


Glucose     ()  mg/dL


 


Lactic Acid     (0.5-2.2)  mmol/L


 


Calcium     (8.5-10.3)  mg/dL


 


Total Bilirubin     (0.2-1.0)  mg/dL


 


AST     (10-42)  IU/L


 


ALT     (10-60)  IU/L


 


Alkaline Phosphatase     ()  IU/L


 


Total Creatine Kinase   57   ()  IU/L


 


C-Reactive Protein    6.4 H  (0-1.0)  mg/dL


 


Total Protein     (6.7-8.2)  g/dL


 


Albumin     (3.2-5.5)  g/dL


 


Globulin     (2.1-4.2)  g/dL


 


Albumin/Globulin Ratio     (1.0-2.2)  


 


Lipase     (22-51)  U/L














  11/01/19 11/01/19 11/01/19 Range/Units





  03:00 03:00 03:00 


 


WBC     (4.8-10.8)  x10^3/uL


 


RBC     (4.20-5.40)  10^6/uL


 


Hgb     (12.0-16.0)  g/dL


 


Hct     (37.0-47.0)  %


 


MCV     (81.0-99.0)  fL


 


MCH     (27.0-31.0)  pg


 


MCHC     (32.0-36.0)  g/dL


 


RDW     (12.0-15.0)  %


 


Plt Count     (130-450)  10^3/uL


 


MPV     (7.9-10.8)  fL


 


Neut # (Auto)     (1.5-6.6)  10^3/uL


 


Lymph # (Auto)     (1.5-3.5)  10^3/uL


 


Mono # (Auto)     (0.0-1.0)  10^3/uL


 


Eos # (Auto)     (0.0-0.7)  10^3/uL


 


Baso # (Auto)     (0.0-0.1)  10^3/uL


 


Absolute Nucleated RBC     x10^3/uL


 


Nucleated RBC %     /100WBC


 


ESR  40 H    (0-20)  mm/Hr


 


PT     (9.9-12.6)  secs


 


INR     (0.8-1.2)  


 


Sodium    133 L  (135-145)  mmol/L


 


Potassium    3.5  (3.5-5.0)  mmol/L


 


Chloride    93 L  (101-111)  mmol/L


 


Carbon Dioxide    29  (21-32)  mmol/L


 


Anion Gap    11.0  (6-13)  


 


BUN    18  (6-20)  mg/dL


 


Creatinine    0.5  (0.4-1.0)  mg/dL


 


Estimated GFR (MDRD)    142  (>89)  


 


Glucose    120 H  ()  mg/dL


 


Lactic Acid   0.8   (0.5-2.2)  mmol/L


 


Calcium    8.9  (8.5-10.3)  mg/dL


 


Total Bilirubin    0.7  (0.2-1.0)  mg/dL


 


AST    65 H  (10-42)  IU/L


 


ALT    65 H  (10-60)  IU/L


 


Alkaline Phosphatase    73  ()  IU/L


 


Total Creatine Kinase     ()  IU/L


 


C-Reactive Protein     (0-1.0)  mg/dL


 


Total Protein    8.1  (6.7-8.2)  g/dL


 


Albumin    3.6  (3.2-5.5)  g/dL


 


Globulin    4.5 H  (2.1-4.2)  g/dL


 


Albumin/Globulin Ratio    0.8 L  (1.0-2.2)  


 


Lipase    23  (22-51)  U/L














  11/01/19 Range/Units





  03:00 


 


WBC  12.8 H  (4.8-10.8)  x10^3/uL


 


RBC  4.32  (4.20-5.40)  10^6/uL


 


Hgb  11.6 L  (12.0-16.0)  g/dL


 


Hct  36.8 L  (37.0-47.0)  %


 


MCV  85.2  (81.0-99.0)  fL


 


MCH  26.9 L  (27.0-31.0)  pg


 


MCHC  31.5 L  (32.0-36.0)  g/dL


 


RDW  14.0  (12.0-15.0)  %


 


Plt Count  423  (130-450)  10^3/uL


 


MPV  9.2  (7.9-10.8)  fL


 


Neut # (Auto)  9.8 H  (1.5-6.6)  10^3/uL


 


Lymph # (Auto)  2.0  (1.5-3.5)  10^3/uL


 


Mono # (Auto)  0.8  (0.0-1.0)  10^3/uL


 


Eos # (Auto)  0.2  (0.0-0.7)  10^3/uL


 


Baso # (Auto)  0.1  (0.0-0.1)  10^3/uL


 


Absolute Nucleated RBC  0.00  x10^3/uL


 


Nucleated RBC %  0.0  /100WBC


 


ESR   (0-20)  mm/Hr


 


PT   (9.9-12.6)  secs


 


INR   (0.8-1.2)  


 


Sodium   (135-145)  mmol/L


 


Potassium   (3.5-5.0)  mmol/L


 


Chloride   (101-111)  mmol/L


 


Carbon Dioxide   (21-32)  mmol/L


 


Anion Gap   (6-13)  


 


BUN   (6-20)  mg/dL


 


Creatinine   (0.4-1.0)  mg/dL


 


Estimated GFR (MDRD)   (>89)  


 


Glucose   ()  mg/dL


 


Lactic Acid   (0.5-2.2)  mmol/L


 


Calcium   (8.5-10.3)  mg/dL


 


Total Bilirubin   (0.2-1.0)  mg/dL


 


AST   (10-42)  IU/L


 


ALT   (10-60)  IU/L


 


Alkaline Phosphatase   ()  IU/L


 


Total Creatine Kinase   ()  IU/L


 


C-Reactive Protein   (0-1.0)  mg/dL


 


Total Protein   (6.7-8.2)  g/dL


 


Albumin   (3.2-5.5)  g/dL


 


Globulin   (2.1-4.2)  g/dL


 


Albumin/Globulin Ratio   (1.0-2.2)  


 


Lipase   (22-51)  U/L














- Diagnostic Imaging


Diagnostic Imaging Comments: 





CT scan shows probable anterolateral proximal forearm abscess with surrounding 

cellulitis





- Other Results/Comments


Other Results/Comments: 





EXAM:  Left elbow/forearm:  Exquisitely tender, red, swollen mass lateral to the

antcubital fossa, measuring 4 cmdiameter..  Painful elbow motion from tender 

mass.  MOves fingers well.  Sensation intact.





Sepsis Event Note (H)





- Evaluation


Current Stage of Sepsis: Sepsis


Possible source of Sepsis: positive: Skin/soft tissue





- Sepsis Criteria


Sepsis Criteria: Recorded Heart Rate greater than 90 bpm, WBC count greater than

12,000 or less than 4000





Assessment/Plan





- Problem List


(1) Abscess of left forearm


Impression: 


PLAN:  To OR this AM for I&D of left forearm abscess.  Consent signed.

## 2019-11-01 NOTE — CT REPORT
Reason:  LUE, abscess

Procedure Date:  11/01/2019   

Accession Number:  [id number] / [id number]                    

Procedure:  CT  - UPPER EXTREMITY W - LT CPT Code:  

 

***Final Report***

 

 

FULL RESULT:

 

 

EXAM:

LEFT UPPER EXTREMITY CT WITH CONTRAST

 

EXAM DATE: 11/1/2019 04:32 AM

 

CLINICAL HISTORY: Left upper extremity redness and swelling, unable to 

lift arm, abscess.

 

COMPARISON: None.

 

TECHNIQUE: Thin-section axial images were acquired of the upper extremity 

from the mid humerus through the elbow after administration of 

intravenous contrast. IV contrast: . Post-processing: Coronal and 

sagittal reformats. Other: None.

 

In accordance with CT protocol optimization, one or more of the following 

dose reduction techniques were utilized for this exam: automated exposure 

control, adjustment of mA and/or KV based on patient size, or use of 

iterative reconstructive technique.

 

FINDINGS:

Bones: No fracture or bone lesion.

 

Joints: The visualized joint spaces are normal.

 

Soft Tissues: Rim-enhancing subcutaneous and intramuscular abscess at the 

lateral aspect of the antecubital fossa measuring approximately 3 x 3 x 3 

cm and is within a few millimeters of the skin surface. Collection 

involves the subcutaneous tissues as well as the anterior aspect of the 

brachioradialis muscle. Diffuse subcutaneous edema, consistent with 

surrounding cellulitis. No other abscess seen. No soft tissue gas. No 

vascular thrombosis.

IMPRESSION:

Approximately 3 x 3 x 3 cm abscess at the lateral aspect of the 

antecubital fossa which involves the subcutaneous tissues and proximal 

forearm musculature.

 

[facility]

## 2019-11-02 VITALS — DIASTOLIC BLOOD PRESSURE: 66 MMHG | SYSTOLIC BLOOD PRESSURE: 105 MMHG

## 2019-11-02 LAB
ANION GAP SERPL CALCULATED.4IONS-SCNC: 11 MMOL/L (ref 6–13)
BASOPHILS NFR BLD AUTO: 0 10^3/UL (ref 0–0.1)
BASOPHILS NFR BLD AUTO: 0.3 %
BUN SERPL-MCNC: 13 MG/DL (ref 6–20)
CALCIUM UR-MCNC: 8.9 MG/DL (ref 8.5–10.3)
CHLORIDE SERPL-SCNC: 103 MMOL/L (ref 101–111)
CO2 SERPL-SCNC: 26 MMOL/L (ref 21–32)
CREAT SERPLBLD-SCNC: 0.4 MG/DL (ref 0.4–1)
CRP SERPL-MCNC: 4.9 MG/DL (ref 0–1)
EOSINOPHIL # BLD AUTO: 0 10^3/UL (ref 0–0.7)
EOSINOPHIL NFR BLD AUTO: 0 %
ERYTHROCYTE [DISTWIDTH] IN BLOOD BY AUTOMATED COUNT: 14 % (ref 12–15)
GFRSERPLBLD MDRD-ARVRAT: 184 ML/MIN/{1.73_M2} (ref 89–?)
GLUCOSE SERPL-MCNC: 155 MG/DL (ref 70–100)
HGB UR QL STRIP: 11 G/DL (ref 12–16)
LYMPHOCYTES # SPEC AUTO: 1.4 10^3/UL (ref 1.5–3.5)
LYMPHOCYTES NFR BLD AUTO: 9.1 %
MAGNESIUM SERPL-MCNC: 2.4 MG/DL (ref 1.7–2.8)
MCH RBC QN AUTO: 26.8 PG (ref 27–31)
MCHC RBC AUTO-ENTMCNC: 30.9 G/DL (ref 32–36)
MCV RBC AUTO: 86.8 FL (ref 81–99)
MONOCYTES # BLD AUTO: 0.5 10^3/UL (ref 0–1)
MONOCYTES NFR BLD AUTO: 3 %
NEUTROPHILS # BLD AUTO: 13.7 10^3/UL (ref 1.5–6.6)
NEUTROPHILS # SNV AUTO: 15.8 X10^3/UL (ref 4.8–10.8)
NEUTROPHILS NFR BLD AUTO: 86.8 %
PDW BLD AUTO: 9.9 FL (ref 7.9–10.8)
PLATELET # BLD: 399 10^3/UL (ref 130–450)
RBC MAR: 4.1 10^6/UL (ref 4.2–5.4)
SODIUM SERPLBLD-SCNC: 140 MMOL/L (ref 135–145)
VANCOMYCIN TROUGH SERPL-MCNC: 12.2 UG/ML (ref 10–20)

## 2019-11-02 RX ADMIN — ACETAMINOPHEN PRN MG: 325 TABLET ORAL at 09:49

## 2019-11-02 RX ADMIN — SODIUM CHLORIDE, PRESERVATIVE FREE SCH: 5 INJECTION INTRAVENOUS at 00:06

## 2019-11-02 RX ADMIN — OXYCODONE PRN MG: 5 TABLET ORAL at 09:50

## 2019-11-02 RX ADMIN — THERA TABS SCH TAB: TAB at 09:44

## 2019-11-02 RX ADMIN — HYDROMORPHONE HYDROCHLORIDE PRN MG: 1 INJECTION, SOLUTION INTRAMUSCULAR; INTRAVENOUS; SUBCUTANEOUS at 14:23

## 2019-11-02 RX ADMIN — METHADONE HYDROCHLORIDE SCH MG: 5 TABLET ORAL at 09:44

## 2019-11-02 RX ADMIN — SODIUM CHLORIDE, PRESERVATIVE FREE PRN ML: 5 INJECTION INTRAVENOUS at 14:24

## 2019-11-02 RX ADMIN — HYDROMORPHONE HYDROCHLORIDE PRN MG: 1 INJECTION, SOLUTION INTRAMUSCULAR; INTRAVENOUS; SUBCUTANEOUS at 11:32

## 2019-11-02 RX ADMIN — SODIUM CHLORIDE, POTASSIUM CHLORIDE, SODIUM LACTATE AND CALCIUM CHLORIDE SCH MLS/HR: 600; 310; 30; 20 INJECTION, SOLUTION INTRAVENOUS at 04:08

## 2019-11-02 RX ADMIN — HYDROMORPHONE HYDROCHLORIDE PRN MG: 1 INJECTION, SOLUTION INTRAMUSCULAR; INTRAVENOUS; SUBCUTANEOUS at 04:19

## 2019-11-02 RX ADMIN — SODIUM CHLORIDE SCH MLS/HR: 9 INJECTION, SOLUTION INTRAVENOUS at 04:15

## 2019-11-02 RX ADMIN — SODIUM CHLORIDE, PRESERVATIVE FREE PRN ML: 5 INJECTION INTRAVENOUS at 11:33

## 2019-11-02 RX ADMIN — METHADONE HYDROCHLORIDE SCH MG: 5 TABLET ORAL at 12:57

## 2019-11-02 RX ADMIN — SODIUM CHLORIDE, PRESERVATIVE FREE SCH: 5 INJECTION INTRAVENOUS at 09:44

## 2019-11-02 RX ADMIN — HEPARIN SODIUM SCH UNIT: 5000 INJECTION, SOLUTION INTRAVENOUS; SUBCUTANEOUS at 11:16

## 2019-11-02 NOTE — DISCHARGE PLAN
Discharge Plan


Problem Reviewed?: Yes


Disposition: 07 Against Medical Advice


Condition: Fair


Prescriptions: 


Cephalexin [Keflex] 500 mg PO QID #28 capsule


Diet: Regular


Activity Restrictions: Additional Comments (no heroin or speed)


No Smoking: If you smoke, Please STOP!  Call [phone] for help.

## 2019-11-02 NOTE — DISCHARGE SUMMARY
Discharge Summary


Admit Date: 11/01/19


Discharge Date: 11/02/19


Discharging Provider: Celestina Valadez MD


Primary Care Provider: No PCP


Code Status: Attempt Resuscitation


Condition at Discharge: Fair


Discharge Disposition: 07 Against Medical Advice





- DIAGNOSES


Discharge Diagnoses with Status of Each Condition: 


1.  Sepsis due to cellulitis


2.  Abscess of left forearm


3.  IV drug user (methamphetamine addiction, heroin addiction)


4.  Hepatitis C








- HPI


History of Present Illness: 


33 year old homeless female with a past medical history significant for meth and

heroin use, and hepatitis C who presents complaining of left arm redness and 

swelling. Her arm became red two days ago but this has progressed over the last 

two days. She now has an area of swelling near her elbow that is tender to 

touch. She does use IV heroin and meth with her last use being one day ago when 

she used both. She last injected in that arm about four days ago. She reports no

fevers, chills, nausea, vomiting. She has not had similar complaints in the 

past. She does have hepatitis C but denies a history of HIV.





In the emergency department, she was afebrile but tachycardic. Blood pressure 

was borderline hypotensive but she reports that her blood pressure is on the 

lower side. Labs revealed an elevated white count with a left shift. Normal 

lactic acid. AST/ALT are mildly elevated. A CT of the left arm revealed a 

[id number] abscess at the lateral aspect for AC fossa involving subcutaneous tissue

and proximal forearm muscles. Medicine was consulted for admission given these 

findings.








History





- Past Medical History


Cardiovascular: reports: None


Respiratory: reports: None


Neuro: reports: None


Endocrine/Autoimmune: reports: None


GI: reports: None, Hepatitis (C)


GYN: reports: None


: reports: None


HEENT: reports: None


Psych: reports: None


Musculoskeletal: reports: None


MRSA Hx?: No


Other Past Medical History: IV drug use





- CONSULTS | PROCEDURES


Consultations: Orthopedics, Dr. Thien Saenz


Procedures: 


1.  Upper extremity CT, left arm.  3 x 3 x 3 cm abscess at the lateral aspect of

the antecubital fossa which involves the subcutaneous tissues and proximal 

forearm musculature.


2.  Wound culture of abscess with staph aureus, pansensitive


3.  Incision and drainage of left forearm abscess








- HOSPITAL COURSE


Hospital Course: 


She was started on empiric antibiotic therapy in the form of vancomycin.  White 

cell count started at 12.8 and increased to 15.8 thousand on the second day of 

admission.  However sedimentation rate went from 40-23.  She remained afebrile 

during her stay.  On the second day of her stay she became agitated and felt 

that she was going to start going through heroin withdrawal and wanted 

methadone.  Methadone was started at 5 mg 3 times daily and she demanded 

increased to 30 mg, 3 times daily.  Methadone was increased.  In spite of that 

the patient left AMA.  I was able to identify a pharmacy that she would be 

willing to  medication at.  As such Keflex 500 mg p.o. 4 times daily was 

called in.  She is homeless in [place] but is currently staying with her aunt in

[place]. We asked her to please keep the wound clean and dry.  We gave her 4 x 

4's to change the wound on a daily basis after cleaning it with water. Although 

she says she would not return, we did ask her to come to the [facility] clinic to get 

her wounds changed and cleaned.  That would be starting the day after tomorrow.I

did ask her to refrain from any more heroin or methamphetamine abuse.  To please

enter into a rehab program.  She states that she hA already done this once 

before and had known resources.








- ALLERGIES


Allergies/Adverse Reactions: 


                                    Allergies











Allergy/AdvReac Type Severity Reaction Status Date / Time


 


No Known Drug Allergies Allergy   Verified 10/31/19 22:36














- MEDICATIONS


Home Medications: 


                                Ambulatory Orders











 Medication  Instructions  Recorded  Confirmed


 


Cephalexin [Keflex] 500 mg PO QID #28 capsule 11/02/19 














- PHYSICAL EXAM AT DISCHARGE


General Appearance: positive: Alert, Other (Cachectic white female who looks 

malnourished)


ENT: positive: Other (Multiple scab lesions of face)


Neck: positive: No JVD


Respiratory: positive: Chest non-tender.  negative: Wheezes, Rales, Rhonchi


Cardiovascular: positive: Regular rate & rhythm.  negative: Systolic murmur, 

Gallop/S4, Friction rub


Abdomen: positive: Non-tender, No organomegaly, Nml bowel sounds, No distention


Skin: positive: Warm, Dry


Extremities: positive: Other (The large red mass of abscess is gone on the left 

forearm.  Surrounding induration and erythema is 50% what it was on admission.  

Bandage in place.)





- LABS


Result Diagrams: 


                                 11/02/19 05:15





                                 11/02/19 05:15





- SEPSIS


Current Stage of Sepsis: Resolved


Possible source of Sepsis: Skin/soft tissue


Sepsis Criteria: Recorded Heart Rate greater than 90 bpm, WBC count greater than

12,000 or less than 4000

## 2019-11-02 NOTE — PROVIDER PROGRESS NOTE
Subjective





- Prog Note Date


Prog Note Date: 11/02/19


Prog Note Time: 12:23





- Subjective


Pt reports feeling: Improved





Objective





- Vital Signs/Intake & Output


Vital Signs: 


                                Vital Signs x48h











  Temp Pulse Resp BP Pulse Ox


 


 11/02/19 06:40  35.9 C L  53 L  16  117/72  100











Intake & Output: 


                                 Intake & Output











 10/30/19 10/31/19 11/01/19 11/02/19





 23:59 23:59 23:59 23:59


 


Intake Total   4288.333 1310


 


Balance   4288.333 1310














- Lab Results


Fish Bones: 


                                 11/02/19 05:15





                                 11/02/19 05:15


Other Labs: 


                               Lab Results x24hrs











  11/02/19 11/02/19 11/02/19 Range/Units





  05:15 05:15 05:15 


 


WBC    15.8 H  (4.8-10.8)  x10^3/uL


 


RBC    4.10 L  (4.20-5.40)  10^6/uL


 


Hgb    11.0 L  (12.0-16.0)  g/dL


 


Hct    35.6 L  (37.0-47.0)  %


 


MCV    86.8  (81.0-99.0)  fL


 


MCH    26.8 L  (27.0-31.0)  pg


 


MCHC    30.9 L  (32.0-36.0)  g/dL


 


RDW    14.0  (12.0-15.0)  %


 


Plt Count    399  (130-450)  10^3/uL


 


MPV    9.9  (7.9-10.8)  fL


 


Neut # (Auto)    13.7 H  (1.5-6.6)  10^3/uL


 


Lymph # (Auto)    1.4 L  (1.5-3.5)  10^3/uL


 


Mono # (Auto)    0.5  (0.0-1.0)  10^3/uL


 


Eos # (Auto)    0.0  (0.0-0.7)  10^3/uL


 


Baso # (Auto)    0.0  (0.0-0.1)  10^3/uL


 


Absolute Nucleated RBC    0.00  x10^3/uL


 


Nucleated RBC %    0.0  /100WBC


 


ESR   23 H   (0-20)  mm/Hr


 


Sodium  140    (135-145)  mmol/L


 


Potassium  3.9    (3.5-5.0)  mmol/L


 


Chloride  103    (101-111)  mmol/L


 


Carbon Dioxide  26    (21-32)  mmol/L


 


Anion Gap  11.0    (6-13)  


 


BUN  13    (6-20)  mg/dL


 


Creatinine  0.4    (0.4-1.0)  mg/dL


 


Estimated GFR (MDRD)  184    (>89)  


 


Glucose  155 H    ()  mg/dL


 


Calcium  8.9    (8.5-10.3)  mg/dL


 


Magnesium  2.4    (1.7-2.8)  mg/dL


 


C-Reactive Protein  4.9 H    (0-1.0)  mg/dL


 


Nasal Screen MRSA (PCR)     (NEGATIVE)  














  11/01/19 Range/Units





  17:19 


 


WBC   (4.8-10.8)  x10^3/uL


 


RBC   (4.20-5.40)  10^6/uL


 


Hgb   (12.0-16.0)  g/dL


 


Hct   (37.0-47.0)  %


 


MCV   (81.0-99.0)  fL


 


MCH   (27.0-31.0)  pg


 


MCHC   (32.0-36.0)  g/dL


 


RDW   (12.0-15.0)  %


 


Plt Count   (130-450)  10^3/uL


 


MPV   (7.9-10.8)  fL


 


Neut # (Auto)   (1.5-6.6)  10^3/uL


 


Lymph # (Auto)   (1.5-3.5)  10^3/uL


 


Mono # (Auto)   (0.0-1.0)  10^3/uL


 


Eos # (Auto)   (0.0-0.7)  10^3/uL


 


Baso # (Auto)   (0.0-0.1)  10^3/uL


 


Absolute Nucleated RBC   x10^3/uL


 


Nucleated RBC %   /100WBC


 


ESR   (0-20)  mm/Hr


 


Sodium   (135-145)  mmol/L


 


Potassium   (3.5-5.0)  mmol/L


 


Chloride   (101-111)  mmol/L


 


Carbon Dioxide   (21-32)  mmol/L


 


Anion Gap   (6-13)  


 


BUN   (6-20)  mg/dL


 


Creatinine   (0.4-1.0)  mg/dL


 


Estimated GFR (MDRD)   (>89)  


 


Glucose   ()  mg/dL


 


Calcium   (8.5-10.3)  mg/dL


 


Magnesium   (1.7-2.8)  mg/dL


 


C-Reactive Protein   (0-1.0)  mg/dL


 


Nasal Screen MRSA (PCR)  NEGATIVE  (NEGATIVE)  














- Other Results/Comments


Other Results/Comments: 





EXAM:  Left elbow:  minimally swollen.  Erythema improved.  Better AROM>  N/V ok

distally.  Packing removed without problem and loosely repacked with corner of 

4x4 gauze.  Kerlex overwrap with Ace wrap





Sepsis Event Note (H)





- Evaluation


Current Stage of Sepsis: Sepsis


Possible source of Sepsis: positive: Skin/soft tissue





- Sepsis Criteria


Sepsis Criteria: Recorded Heart Rate greater than 90 bpm, WBC count greater than

12,000 or less than 4000





Assessment/Plan





- Problem List


(1) Abscess of left forearm


Impression: 


Improved





PLAN:  Continue on anti - Staph aureus antibiotics, switching to orals in a day 

or so.  Keep on antibiotics until elbow wound heals secondarily (about a week). 

Nursing to change dressing daily until discharge, then to wound care clinic 

daily for dressing changes until healed (about 7 days).

## 2025-05-25 NOTE — OPERATIVE REPORT
Operative Report





- General


Admit Date: 11/01/19


Procedure Date: 11/01/19


Planned Procedure: 





Incision and drainage of left forearm abscess


Pre-Op Diagnosis: Left forearm abscess


Procedure Performed: 





Incision and drainage of left forearm abscess


Post Op Diagnosis: Same





- Procedure Note


Primary Surgeon: VICKIE Saenz MD


Anesthesia Provider: Madelaine Chau


Anesthesia Technique: General LMA


IV Fluids (mL): 600


Estimated Blood Loss (mL): 50


Drain/Tube Type: Other (1/2 inch gauze packing)


Complications: 





None Her/She